# Patient Record
Sex: MALE | Race: WHITE | NOT HISPANIC OR LATINO | Employment: OTHER | URBAN - METROPOLITAN AREA
[De-identification: names, ages, dates, MRNs, and addresses within clinical notes are randomized per-mention and may not be internally consistent; named-entity substitution may affect disease eponyms.]

---

## 2023-10-04 ENCOUNTER — OFFICE VISIT (OUTPATIENT)
Dept: FAMILY MEDICINE | Facility: CLINIC | Age: 73
End: 2023-10-04
Payer: COMMERCIAL

## 2023-10-04 VITALS
HEIGHT: 73 IN | TEMPERATURE: 97.7 F | BODY MASS INDEX: 21.87 KG/M2 | DIASTOLIC BLOOD PRESSURE: 76 MMHG | WEIGHT: 165 LBS | SYSTOLIC BLOOD PRESSURE: 127 MMHG | OXYGEN SATURATION: 97 % | HEART RATE: 80 BPM

## 2023-10-04 DIAGNOSIS — M23.8X1 DEFICIENCY OF ANTERIOR CRUCIATE LIGAMENT OF RIGHT KNEE: ICD-10-CM

## 2023-10-04 DIAGNOSIS — Z23 NEED FOR IMMUNIZATION AGAINST INFLUENZA: Primary | ICD-10-CM

## 2023-10-04 RX ORDER — AMOXICILLIN 500 MG
1200 CAPSULE ORAL
COMMUNITY

## 2023-10-04 RX ORDER — MULTIVIT WITH MINERALS/LUTEIN
1 TABLET ORAL DAILY
COMMUNITY

## 2023-10-04 RX ORDER — TAMSULOSIN HYDROCHLORIDE 0.4 MG/1
0.4 CAPSULE ORAL 2 TIMES DAILY
COMMUNITY
Start: 2023-04-17 | End: 2023-12-12

## 2023-10-04 RX ORDER — CICLOPIROX 80 MG/ML
SOLUTION TOPICAL
COMMUNITY
Start: 2023-09-14

## 2023-10-04 RX ORDER — KETOCONAZOLE 20 MG/G
CREAM TOPICAL DAILY
COMMUNITY
End: 2024-04-22

## 2023-10-04 RX ORDER — SILDENAFIL CITRATE 20 MG/1
20 TABLET ORAL PRN
COMMUNITY
End: 2023-12-12

## 2023-10-04 ASSESSMENT — ENCOUNTER SYMPTOMS
DYSURIA: 0
NAUSEA: 0
CONSTIPATION: 0
JOINT SWELLING: 0
HEARTBURN: 0
DIARRHEA: 0
PALPITATIONS: 0
FREQUENCY: 0
COUGH: 0
SORE THROAT: 0
NERVOUS/ANXIOUS: 0
CHILLS: 0
SHORTNESS OF BREATH: 0
HEMATOCHEZIA: 0
MYALGIAS: 0
WEAKNESS: 0
ABDOMINAL PAIN: 0
ARTHRALGIAS: 0
EYE PAIN: 0
FEVER: 0
DIZZINESS: 0
PARESTHESIAS: 0
HEMATURIA: 0
HEADACHES: 0

## 2023-10-04 ASSESSMENT — ACTIVITIES OF DAILY LIVING (ADL): CURRENT_FUNCTION: NO ASSISTANCE NEEDED

## 2023-10-04 NOTE — NURSING NOTE
"73 year old  Chief Complaint   Patient presents with    [facility]     Health history         Blood pressure 127/76, pulse 80, temperature 97.7  F (36.5  C), temperature source Skin, height 1.842 m (6' 0.5\"), weight 74.8 kg (165 lb), SpO2 97 %. Body mass index is 22.07 kg/m .  Patient Active Problem List   Diagnosis    History of melanoma       Wt Readings from Last 2 Encounters:   10/04/23 74.8 kg (165 lb)     BP Readings from Last 3 Encounters:   10/04/23 127/76         Current Outpatient Medications   Medication    ciclopirox (PENLAC) 8 % external solution    ketoconazole (NIZORAL) 2 % external cream    LUTEIN PO    multivitamin (CENTRUM SILVER) tablet    Omega-3 Fatty Acids (FISH OIL) 1200 MG capsule    sildenafil (REVATIO) 20 MG tablet    tamsulosin (FLOMAX) 0.4 MG capsule     No current facility-administered medications for this visit.       Social History     Tobacco Use    Smoking status: Never    Smokeless tobacco: Never   Vaping Use    Vaping Use: Never used       Health Maintenance Due   Topic Date Due    ADVANCE CARE PLANNING  Never done    COLORECTAL CANCER SCREENING  Never done    HEPATITIS C SCREENING  Never done    LIPID  Never done    FALL RISK ASSESSMENT  Never done    AORTIC ANEURYSM SCREENING (SYSTEM ASSIGNED)  Never done    INFLUENZA VACCINE (1) 09/01/2023    COVID-19 Vaccine (6 - 2023-24 season) 09/01/2023       No results found for: PAP      October 4, 2023 9:14 AM   "

## 2023-10-04 NOTE — PATIENT INSTRUCTIONS
-  ASSESSMENT and PLAN:    Preventive health needs  - In need of influenza vaccine  Give Flu vaccine today    - Go to pharmacy for Covid Booster    -Up to date on Colonoscopy until May 2026    - Lipids from April 2023 reviewed with TC = 144, LDL at 70 and HDL at 64.   - PSA was 0.3  -A1C was 5.2%      Other Concerns  Prostatic hypertrophy - on Tamsulosin  History of Melanoma on RIGHT ankle. Has a dermatologist  Erectile dysfunction - on Sildenafil  RIGHT knee with non-repaired ACL tear in the past  Bilateral knee surgeries in the past      Follow-up in April 2024 for annual exam    Renzo Zafar MD  Family Medicine and Sports Medicine  [facility]

## 2023-10-04 NOTE — NURSING NOTE
"Injectable Influenza Immunization Documentation    1.  Has the patient received the information for the injectable influenza vaccine? YES     2. Is the patient 6 months of age or older? YES     3. Does the patient have any of the following contraindications?         Severe allergy to eggs? No     Severe allergic reaction to previous influenza vaccines? No   Severe allergy to latex? No       History of Guillain-McDermitt syndrome? No     Currently have a temperature greater than 100.4F? No        4.  Severely egg allergic patients should have flu vaccine eligibility assessed by an MD, RN, or pharmacist, and those who received flu vaccine should be observed for 15 min by an MD, RN, Pharmacist, Medical Technician, or member of clinic staff.\": YES    5. Latex-allergic patients should be given latex-free influenza vaccine Yes. Please reference the Vaccine latex table to determine if your clinic s product is latex-containing.       Vaccination given by Claudette Plaza CMA on 10/4/2023 at 9:38 AM        "

## 2023-10-04 NOTE — PROGRESS NOTES
Medical assistant intake:  Renzo Lara is a 73 year old male who presents to [facility] today for Establish Care (Health history/)       -  ASSESSMENT and PLAN:    Preventive health needs  - In need of influenza vaccine  Give Flu vaccine today    - Go to pharmacy for Covid Booster    -Up to date on Colonoscopy until May 2026    - Lipids from April 2023 reviewed with TC = 144, LDL at 70 and HDL at 64.   - PSA was 0.3  -A1C was 5.2%      Other Concerns  Prostatic hypertrophy - on Tamsulosin  History of Melanoma on RIGHT ankle. Has a dermatologist  Erectile dysfunction - on Sildenafil  RIGHT knee with non-repaired ACL tear in the past  Bilateral knee surgeries in the past      Follow-up in April 2024 for annual exam    Renzo Zafar MD  Family Medicine and Sports Medicine  [facility]      SUBJECTIVE:   Renzo is a 74 yo who is new to our clinic.       His PMH includes   Prostatic hypertrophy - on Tamsulosin  History of Melanoma. Has a dermatologist  Erectile dysfunction - on Sildenafil    He's interested in receiving his Flu vaccine today.     Review Of Systems:    See subjective.   Has otherwise been in usual state of health, e.g.   Cardiovascular: negative  Respiratory: No shortness of breath, dyspnea on exertion, cough, or hemoptysis  Gastrointestinal: negative  Genitourinary: negative    Problem list per EMR:  Patient Active Problem List   Diagnosis    History of melanoma       Current Outpatient Medications   Medication Sig Dispense Refill    ciclopirox (PENLAC) 8 % external solution APPLY A THIN COAT ONCE DAILY FOR 6 DAYS . USE ALCOHOL TO CLEAN TOENAIL ON DAY 7 THEN REPEAT UNTIL RESOLVED      ketoconazole (NIZORAL) 2 % external cream Apply topically daily      LUTEIN PO Take 1 tablet by mouth daily      multivitamin (CENTRUM SILVER) tablet Take 1 tablet by mouth daily      Omega-3 Fatty Acids (FISH OIL) 1200 MG capsule Take 1,200 mg by mouth      sildenafil (REVATIO) 20 MG tablet 20 mg as needed  "     tamsulosin (FLOMAX) 0.4 MG capsule Take 0.4 mg by mouth 2 times daily         Allergies   Allergen Reactions    Fluticasone      Anosmia        Social:   Social History     Social History Narrative    A  who worked in both [employer] and [employer].      once and then  and then . Now with a steady new partner.     Has 2 daughters and 2 granddaughters.          OBJECTIVE    Vitals: /76 (BP Location: Right arm, Patient Position: Sitting, Cuff Size: Adult Regular)   Pulse 80   Temp 97.7  F (36.5  C) (Skin)   Ht 1.842 m (6' 0.5\")   Wt 74.8 kg (165 lb)   SpO2 97%   BMI 22.07 kg/m    BMI= Body mass index is 22.07 kg/m .    He appears well and in no distress.  He appears is a physically fit 73-year-old.    Neck is supple without lymphadenopathy    Cardiovascular-regular rate and rhythm without murmur.     Lungs are clear to auscultation    Extremities are without edema    Gait is normal.    SEE TOP OF NOTE FOR ASSESSMENT AND PLAN    --Renzo Zafar MD  [facility], Department of Family Medicine and Community Health  "

## 2023-10-16 ENCOUNTER — MYC MEDICAL ADVICE (OUTPATIENT)
Dept: FAMILY MEDICINE | Facility: CLINIC | Age: 73
End: 2023-10-16

## 2023-12-12 ENCOUNTER — OFFICE VISIT (OUTPATIENT)
Dept: FAMILY MEDICINE | Facility: CLINIC | Age: 73
End: 2023-12-12
Payer: COMMERCIAL

## 2023-12-12 VITALS
SYSTOLIC BLOOD PRESSURE: 122 MMHG | WEIGHT: 162.04 LBS | DIASTOLIC BLOOD PRESSURE: 78 MMHG | OXYGEN SATURATION: 99 % | BODY MASS INDEX: 21.67 KG/M2 | HEART RATE: 73 BPM | TEMPERATURE: 97.1 F

## 2023-12-12 DIAGNOSIS — M67.431 GANGLION CYST OF WRIST, RIGHT: Primary | ICD-10-CM

## 2023-12-12 DIAGNOSIS — N52.9 ERECTILE DYSFUNCTION, UNSPECIFIED ERECTILE DYSFUNCTION TYPE: ICD-10-CM

## 2023-12-12 DIAGNOSIS — R39.198 SLOWING OF URINARY STREAM: ICD-10-CM

## 2023-12-12 RX ORDER — SILDENAFIL CITRATE 20 MG/1
TABLET ORAL
Qty: 30 TABLET | Refills: 1 | Status: SHIPPED | OUTPATIENT
Start: 2023-12-12 | End: 2023-12-19

## 2023-12-12 RX ORDER — TAMSULOSIN HYDROCHLORIDE 0.4 MG/1
0.4 CAPSULE ORAL 2 TIMES DAILY
Qty: 180 CAPSULE | Refills: 3 | Status: SHIPPED | OUTPATIENT
Start: 2023-12-12 | End: 2024-03-01

## 2023-12-12 NOTE — PROGRESS NOTES
Medical assistant intake:  Renzo Lara is a 73 year old male who presents to [facility] today for Mass (Right wrist lump )       -  ASSESSMENT and PLAN:    - RIGHT wrist with ganglion cyst that lysed spontaneously  Discussed pathophysiology of ganglion cyst.   Use compression  Offered X-rays of wrist, but Renzo declined for now.         - Med refills needed.     1. Slowing of urinary stream    - tamsulosin (FLOMAX) 0.4 MG capsule; Take 1 capsule (0.4 mg) by mouth 2 times daily  Dispense: 180 capsule; Refill: 3    2. Erectile dysfunction, unspecified erectile dysfunction type    - sildenafil (REVATIO) 20 MG tablet; Take 30-60 minutes prior to sex  Dispense: 30 tablet; Refill: 1    Warned to beware of lightheadedness when using the 2 medicines. Renzo is aware and has used in the past without problems.     Return to clinic in Spring 2024 for wellness visit.     Renzo Zafar MD  Family Medicine and Sports Medicine  [facility]      SUBJECTIVE:   Renzo  is here today with some swelling near his right wrist.  This is on the volar aspect and radial side.  He reports a long history of some diffuse swelling in this area but then last week when he was in [place] he noticed a smaller bump that was slightly distal to his normal swelling and felt harder and more encapsulated.  It was mobile.  He called an appointment.  Then within the past 2 nights perhaps while he was sleeping and had his head under his pillow he awoke in the morning and noticed that the swelling was gone.  He almost canceled his visit today.  He is here to discuss what this may have been and also to have some medication refills for Flomax and sildenafil.    Review Of Systems:    See subjective.   Has otherwise been in usual state of health, e.g.   Cardiovascular: negative  Respiratory: No shortness of breath, dyspnea on exertion, cough, or hemoptysis  Gastrointestinal: negative  Genitourinary: negative    Problem list per EMR:  Patient  Active Problem List   Diagnosis    History of melanoma       Current Outpatient Medications   Medication Sig Dispense Refill    ciclopirox (PENLAC) 8 % external solution APPLY A THIN COAT ONCE DAILY FOR 6 DAYS . USE ALCOHOL TO CLEAN TOENAIL ON DAY 7 THEN REPEAT UNTIL RESOLVED      ketoconazole (NIZORAL) 2 % external cream Apply topically daily      multivitamin (CENTRUM SILVER) tablet Take 1 tablet by mouth daily      Omega-3 Fatty Acids (FISH OIL) 1200 MG capsule Take 1,200 mg by mouth      sildenafil (REVATIO) 20 MG tablet Take 30-60 minutes prior to sex 30 tablet 1    tamsulosin (FLOMAX) 0.4 MG capsule Take 1 capsule (0.4 mg) by mouth 2 times daily 180 capsule 3       Allergies   Allergen Reactions    Fluticasone      Anosmia        Social:     A retired .      OBJECTIVE    Vitals: /78   Pulse 73   Temp 97.1  F (36.2  C)   Wt 73.5 kg (162 lb 0.6 oz)   SpO2 99%   BMI 21.67 kg/m    BMI= Body mass index is 21.67 kg/m .  He appears well and in no distress.   right wrist is with a small bit of diffuse swelling just proximal to the radial styloid and he states that that has been present for a long time and is unchanged.  It is nonpulsatile.  It is not red and it is not warm.  He describes an area just distal to his radial styloid where he had a swelling that has since resolved.  He has full range of motion of his wrist and digits  No pain with palpation over the scaphoid tubercle and no pain in the snuffbox.      SEE TOP OF NOTE FOR ASSESSMENT AND PLAN    --Renzo Zafar MD  [facility], Department of Family Medicine and Community Health

## 2023-12-12 NOTE — NURSING NOTE
"Renzo  73 year old    Chief Complaint   Patient presents with    Mass     Right wrist lump             Blood pressure 122/78, pulse 73, temperature 97.1  F (36.2  C), weight 73.5 kg (162 lb 0.6 oz), SpO2 99%. Body mass index is 21.67 kg/m .    Patient Active Problem List   Diagnosis    History of melanoma              Wt Readings from Last 2 Encounters:   12/12/23 73.5 kg (162 lb 0.6 oz)   10/04/23 74.8 kg (165 lb)       BP Readings from Last 3 Encounters:   12/12/23 122/78   10/04/23 127/76                Current Outpatient Medications   Medication    ciclopirox (PENLAC) 8 % external solution    ketoconazole (NIZORAL) 2 % external cream    multivitamin (CENTRUM SILVER) tablet    Omega-3 Fatty Acids (FISH OIL) 1200 MG capsule    sildenafil (REVATIO) 20 MG tablet    tamsulosin (FLOMAX) 0.4 MG capsule     No current facility-administered medications for this visit.              Social History     Tobacco Use    Smoking status: Never    Smokeless tobacco: Never   Vaping Use    Vaping Use: Never used              Health Maintenance Due   Topic Date Due    ADVANCE CARE PLANNING  Never done    HEPATITIS C SCREENING  Never done    LIPID  Never done    RSV VACCINE (Pregnancy & 60+) (1 - 1-dose 60+ series) Never done    FALL RISK ASSESSMENT  Never done    AORTIC ANEURYSM SCREENING (SYSTEM ASSIGNED)  Never done            No results found for: \"PAP\"           December 12, 2023 8:56 AM    "

## 2023-12-12 NOTE — PATIENT INSTRUCTIONS
-  ASSESSMENT and PLAN:    - RIGHT wrist with ganglion cyst that lysed spontaneously  Discussed pathophysiology of ganglion cyst.   Use compression  Offered X-rays of wrist, but Renzo declined for now.         - Med refills needed.     1. Slowing of urinary stream    - tamsulosin (FLOMAX) 0.4 MG capsule; Take 1 capsule (0.4 mg) by mouth 2 times daily  Dispense: 180 capsule; Refill: 3    2. Erectile dysfunction, unspecified erectile dysfunction type    - sildenafil (REVATIO) 20 MG tablet; Take 30-60 minutes prior to sex  Dispense: 30 tablet; Refill: 1    Warned to beware of lightheadedness when using the 2 medicines. Renzo is aware and has used in the past without problems.     Renzo Zafar MD  Family Medicine and Sports Medicine  [facility]

## 2023-12-13 ENCOUNTER — TELEPHONE (OUTPATIENT)
Dept: FAMILY MEDICINE | Facility: CLINIC | Age: 73
End: 2023-12-13

## 2023-12-13 NOTE — TELEPHONE ENCOUNTER
Prior Authorization Retail Medication Request    Medication/Dose: Sildenafil (REVATIO) 20 MG  Diagnosis and ICD code (if different than what is on RX):  Same  New/renewal/insurance change PA/secondary ins. PA:  Previously Tried and Failed:  Same  Rationale:  Same    Insurance   Primary: Same  Insurance ID:  Same    Secondary (if applicable):Same  Insurance ID:  Same    Covermymeds  Key:  [id number]  Last Name:  Jane  :  1950    Pharmacy Information (if different than what is on RX)  Name:  Same  Phone:  Same  Fax:Same    PA request came from [employer].  (I'm assuming initiated by [facility])    JOSÉ MIGUEL WalkerN, RN, CCM  RN Care Coordinator  [facility]  23  9:36 AM  Phone: [phone]

## 2023-12-16 NOTE — TELEPHONE ENCOUNTER
Central Prior Authorization Team   Phone: [phone]    PA Initiation    Medication: Sildenafil (REVATIO) 20 MG  Insurance Company: [employer] ([facility]) - Phone [phone] Fax [phone]  Pharmacy Filling the Rx: [facility] - [facility] [address] [place] [address]  Filling Pharmacy Phone: [phone]  Filling Pharmacy Fax:    Start Date: 12/16/2023

## 2023-12-17 ENCOUNTER — MYC MEDICAL ADVICE (OUTPATIENT)
Dept: FAMILY MEDICINE | Facility: CLINIC | Age: 73
End: 2023-12-17

## 2023-12-18 NOTE — TELEPHONE ENCOUNTER
PRIOR AUTHORIZATION DENIED    Medication: Sildenafil (REVATIO) 20 MG-PA DENIED     Denial Date: 12/16/2023    Denial Rational:           Appeal Information:

## 2023-12-19 DIAGNOSIS — N52.9 ERECTILE DYSFUNCTION, UNSPECIFIED ERECTILE DYSFUNCTION TYPE: ICD-10-CM

## 2023-12-19 RX ORDER — SILDENAFIL CITRATE 20 MG/1
TABLET ORAL
Qty: 30 TABLET | Refills: 1 | Status: SHIPPED | OUTPATIENT
Start: 2023-12-19 | End: 2023-12-19

## 2023-12-19 RX ORDER — SILDENAFIL CITRATE 20 MG/1
TABLET ORAL
Qty: 30 TABLET | Refills: 1 | Status: SHIPPED | OUTPATIENT
Start: 2023-12-19

## 2024-03-01 ENCOUNTER — MYC MEDICAL ADVICE (OUTPATIENT)
Dept: FAMILY MEDICINE | Facility: CLINIC | Age: 74
End: 2024-03-01

## 2024-03-01 DIAGNOSIS — R39.198 SLOWING OF URINARY STREAM: ICD-10-CM

## 2024-03-01 RX ORDER — TAMSULOSIN HYDROCHLORIDE 0.4 MG/1
0.4 CAPSULE ORAL 2 TIMES DAILY
Qty: 180 CAPSULE | Refills: 3 | Status: CANCELLED | OUTPATIENT
Start: 2024-03-01

## 2024-03-08 ENCOUNTER — TELEPHONE (OUTPATIENT)
Dept: FAMILY MEDICINE | Facility: CLINIC | Age: 74
End: 2024-03-08

## 2024-03-08 NOTE — TELEPHONE ENCOUNTER
Spoke with representative Lala at [email or web address]. Confirmed diagnosis code for tamsulosin 0.4 mg.    Deuce NUNEZ, RN  03/08/24 11:16 AM

## 2024-03-08 NOTE — TELEPHONE ENCOUNTER
Who is calling? Vikash - [employer]   Reason for Call:Requests call back regarding TAMSULOSIN 0.4MG. Needs to know what the patient's diagnosis is for this medication and any previous medications the patient has tried under the same diagnosis.   Vikash said when calling back at [phone] press OPTION 3.   REFERENCE # [id number]

## 2024-03-11 ENCOUNTER — TELEPHONE (OUTPATIENT)
Dept: FAMILY MEDICINE | Facility: CLINIC | Age: 74
End: 2024-03-11

## 2024-03-11 NOTE — TELEPHONE ENCOUNTER
Prior Authorization Retail Medication Request    Medication/Dose: tamsulosin (FLOMAX) 0.4 MG capsule   Diagnosis and ICD code (if different than what is on RX):  same  New/renewal/insurance change PA/secondary ins. PA:  Previously Tried and Failed:  same  Rationale:  same    Insurance   Primary: same  Insurance ID:  same    Secondary (if applicable):same  Insurance ID:  same    Pharmacy Information (if different than what is on RX)  Name:  [facility]  Phone:  [phone]  Fax:[phone]    MAYRA Ferrer, RN  03/11/24, 1:18 PM

## 2024-03-21 NOTE — TELEPHONE ENCOUNTER
Retail Pharmacy Prior Authorization Team   Phone: [phone]    PA Initiation    Medication: TAMSULOSIN HCL 0.4 MG PO CAPS  Insurance Company: [employer] - Phone [phone] Fax [phone]  Pharmacy Filling the Rx: [facility] - [place], [place] - [address] [place] [address]  Filling Pharmacy Phone: [phone]  Filling Pharmacy Fax:    Start Date: 3/21/2024

## 2024-03-26 NOTE — TELEPHONE ENCOUNTER
Called [employer] for PA status check. Per rep April, additional information is needed to complete review and April stated the form was faxed on 3/22 but we have not received and request to have it re-faxed. April will refax and she stated that the due date to return in 3/28. Will complete and fax back.

## 2024-03-28 NOTE — TELEPHONE ENCOUNTER
Called [employer] to check on PA status as due date is 3/28/24 at 12:34pm. Per rep, the form was not received and provided me an alternative fax# [phone] and alanna it urgent. Rep stated he will also notify the clinical review dept to look out for it.     Fax successful

## 2024-04-02 NOTE — TELEPHONE ENCOUNTER
Called [facility] for status check, per rep, she stated that the PA was already denied a couple times before the last fax I sent on 3/28/24. Rep stated that the last rep should have told me that the PA was already denied and has been forwarded to an external reviewer (C2C) as a second level appeal. Rep will fax over all the denial letter.

## 2024-04-03 NOTE — TELEPHONE ENCOUNTER
Tier Exception DENIED    Medication: Tamsulosin 0.4mg caps  Insurance Company: [employer] - Phone [phone] Fax [phone]  Pharmacy Filling the RX: [facility] - [place], [place] - [address] [place] [address]  Filling Pharmacy Phone: [phone]          Appeal information:

## 2024-04-04 NOTE — TELEPHONE ENCOUNTER
Patient notified of PA denial.  Patient will finish the Tamsulosin he currently has (about a month's worth) and then I will ask Dr. Zafar to send in a Tier 1 prescription in a few weeks so he has a replacement to start on time so there isn't a gap in treatment.  JOSÉ MIGUEL WalkerN, RN, CCM  RN Care Coordinator  [facility]  04/04/24  9:28 AM  Phone: [phone]

## 2024-04-15 ASSESSMENT — SOCIAL DETERMINANTS OF HEALTH (SDOH): HOW OFTEN DO YOU GET TOGETHER WITH FRIENDS OR RELATIVES?: MORE THAN THREE TIMES A WEEK

## 2024-04-22 ENCOUNTER — OFFICE VISIT (OUTPATIENT)
Dept: FAMILY MEDICINE | Facility: CLINIC | Age: 74
End: 2024-04-22
Payer: COMMERCIAL

## 2024-04-22 VITALS
BODY MASS INDEX: 21.74 KG/M2 | DIASTOLIC BLOOD PRESSURE: 67 MMHG | HEIGHT: 73 IN | TEMPERATURE: 97.1 F | OXYGEN SATURATION: 99 % | SYSTOLIC BLOOD PRESSURE: 115 MMHG | HEART RATE: 70 BPM | WEIGHT: 164 LBS

## 2024-04-22 DIAGNOSIS — S83.511A RUPTURE OF ANTERIOR CRUCIATE LIGAMENT OF RIGHT KNEE, INITIAL ENCOUNTER: ICD-10-CM

## 2024-04-22 DIAGNOSIS — Z00.00 ANNUAL PHYSICAL EXAM: Primary | ICD-10-CM

## 2024-04-22 DIAGNOSIS — Z13.6 SCREENING FOR CARDIOVASCULAR CONDITION: ICD-10-CM

## 2024-04-22 DIAGNOSIS — Z11.59 NEED FOR HEPATITIS C SCREENING TEST: ICD-10-CM

## 2024-04-22 DIAGNOSIS — Z13.228 SCREENING FOR METABOLIC DISORDER: ICD-10-CM

## 2024-04-22 DIAGNOSIS — Z12.5 SCREENING FOR PROSTATE CANCER: ICD-10-CM

## 2024-04-22 DIAGNOSIS — Z13.1 SCREENING FOR DIABETES MELLITUS: ICD-10-CM

## 2024-04-22 LAB
ALBUMIN SERPL BCG-MCNC: 4.5 G/DL (ref 3.5–5.2)
ALP SERPL-CCNC: 65 U/L (ref 40–150)
ALT SERPL W P-5'-P-CCNC: 17 U/L (ref 0–70)
ANION GAP SERPL CALCULATED.3IONS-SCNC: 10 MMOL/L (ref 7–15)
AST SERPL W P-5'-P-CCNC: 23 U/L (ref 0–45)
BILIRUB SERPL-MCNC: 0.8 MG/DL
BUN SERPL-MCNC: 17.9 MG/DL (ref 8–23)
CALCIUM SERPL-MCNC: 9.7 MG/DL (ref 8.8–10.2)
CHLORIDE SERPL-SCNC: 104 MMOL/L (ref 98–107)
CHOLEST SERPL-MCNC: 127 MG/DL
CREAT SERPL-MCNC: 0.91 MG/DL (ref 0.67–1.17)
DEPRECATED HCO3 PLAS-SCNC: 28 MMOL/L (ref 22–29)
EGFRCR SERPLBLD CKD-EPI 2021: 90 ML/MIN/1.73M2
FASTING STATUS PATIENT QL REPORTED: YES
GLUCOSE SERPL-MCNC: 86 MG/DL (ref 70–99)
HBA1C MFR BLD: 4.9 % (ref 0–5.6)
HCV AB SERPL QL IA: NONREACTIVE
HDLC SERPL-MCNC: 60 MG/DL
LDLC SERPL CALC-MCNC: 56 MG/DL
NONHDLC SERPL-MCNC: 67 MG/DL
POTASSIUM SERPL-SCNC: 5 MMOL/L (ref 3.4–5.3)
PROT SERPL-MCNC: 6.8 G/DL (ref 6.4–8.3)
PSA SERPL DL<=0.01 NG/ML-MCNC: 0.25 NG/ML (ref 0–6.5)
SODIUM SERPL-SCNC: 142 MMOL/L (ref 135–145)
TRIGL SERPL-MCNC: 54 MG/DL

## 2024-04-22 NOTE — NURSING NOTE
"73 year old  Chief Complaint   Patient presents with    Physical     No concerns.        Blood pressure 115/67, pulse 70, temperature 97.1  F (36.2  C), temperature source Skin, height 1.842 m (6' 0.52\"), weight 74.4 kg (164 lb), SpO2 99%. Body mass index is 21.92 kg/m .  Patient Active Problem List   Diagnosis    History of melanoma    Erectile dysfunction, unspecified erectile dysfunction type    Slowing of urinary stream       Wt Readings from Last 2 Encounters:   04/22/24 74.4 kg (164 lb)   12/12/23 73.5 kg (162 lb 0.6 oz)     BP Readings from Last 3 Encounters:   04/22/24 115/67   12/12/23 122/78   10/04/23 127/76         Current Outpatient Medications   Medication Sig Dispense Refill    ciclopirox (PENLAC) 8 % external solution APPLY A THIN COAT ONCE DAILY FOR 6 DAYS . USE ALCOHOL TO CLEAN TOENAIL ON DAY 7 THEN REPEAT UNTIL RESOLVED      multivitamin (CENTRUM SILVER) tablet Take 1 tablet by mouth daily      Omega-3 Fatty Acids (FISH OIL) 1200 MG capsule Take 1,200 mg by mouth      sildenafil (REVATIO) 20 MG tablet Take one tablet 30-60 minutes prior to sex once daily 30 tablet 1    tamsulosin (FLOMAX) 0.4 MG capsule Take 1 capsule (0.4 mg) by mouth 2 times daily 180 capsule 3    ketoconazole (NIZORAL) 2 % external cream Apply topically daily       No current facility-administered medications for this visit.       Social History     Tobacco Use    Smoking status: Never    Smokeless tobacco: Never   Vaping Use    Vaping status: Never Used   Substance Use Topics    Drug use: Never       Health Maintenance Due   Topic Date Due    ADVANCE CARE PLANNING  Never done    GLUCOSE  Never done    HEPATITIS C SCREENING  Never done    LIPID  Never done    RSV VACCINE (Pregnancy & 60+) (1 - 1-dose 60+ series) Never done    MEDICARE ANNUAL WELLNESS VISIT  04/17/2024       No results found for: \"PAP\"      April 22, 2024 8:30 AM   "

## 2024-04-22 NOTE — PATIENT INSTRUCTIONS
"IMPRESSION  Healthy 72 yo.     ASSESSMENT/PLAN:    Annual Exam/Preventive Issues   - Labs: Check Lipids, CMP, A1C as both parents had DM, and Hep C as never done.   - Immunizations: Up to date  - Cancer screenings: Up to date on Colon screening until 2026.   Check PSA today  - Other recommendations:   Ask for a hearing evaluation when you get your ears evaluated in 2 days  For screening of eyes in 2 days  Has regular follow up with Dermatology  Send or bring us your \"Healthcare directives      -Specific concerns:     Chronic RIGHT knee ACL tear from injury about 30 years ago  -Not causing problems.   -Work on Hamstring strength and balance.     2. Slow urinary stream  -Takes Tamsulosin, takes 0.8 mg/day. No need for refills.     -Follow up: as needed or in a year    Renzo Zafar MD  Family Medicine and Sports Medicine  "

## 2024-04-22 NOTE — PROGRESS NOTES
"Preventive Care Visit  [facility]  Renzo Lara presents to [facility] today to have an annual exam.    IMPRESSION  Healthy 74 yo.     ASSESSMENT/PLAN:    Annual Exam/Preventive Issues   - Labs: Check Lipids, CMP, A1C as both parents had DM, and Hep C as never done.   - Immunizations: Up to date  - Cancer screenings: Up to date on Colon screening until 2026.   Check PSA today  - Other recommendations:   Ask for a hearing evaluation when you get your ears evaluated in 2 days  For screening of eyes in 2 days  Has regular follow up with Dermatology  Send or bring us your \"Healthcare directives      -Specific concerns:     Chronic RIGHT knee ACL tear from injury about 30 years ago  -Not causing problems.   -Work on Hamstring strength and balance.     2. Slow urinary stream  -Takes Tamsulosin, takes 0.8 mg/day. No need for refills.     -Follow up: as needed or in a year    Renzo Zafar MD  Family Medicine and Sports Medicine      INTRODUCTION:   Renzo is a 74 yo who I've met a few times over the past year but never for an annual exam.   He's feeling well and here for an annual preventive exam.   Main medical issues have been as per the Problem List.    Cognition: No concerns. 5/5 on minicog  Eyes: Has regular evaluations. Next is in 2 days. Has cataracts that are being observed.   Hearing: Has some hearing loss. Goes for regular ear cleanings.   Dental: Has regular visits  Skin: H/O melanoma on leg. Sees Dermatology 2 times/year  Balance: Good. No falls.     Colorectal cancer screenings: Reports a normal colonoscopy in 2016. In Care Everywhere system  Advance directive: Has a copy.                     4/15/2024   General Health   How would you rate your overall physical health? Excellent   Feel stress (tense, anxious, or unable to sleep) Only a little   (!) STRESS CONCERN      4/15/2024   Nutrition   Diet: Regular (no restrictions)         10/4/2023   Exercise   Frequency of exercise: 4-5 days/week "         4/15/2024   Social Factors   Frequency of gathering with friends or relatives More than three times a week   Worry food won't last until get money to buy more No   Food not last or not have enough money for food? No   Do you have housing?  Yes   Are you worried about losing your housing? No   Lack of transportation? No   Unable to get utilities (heat,electricity)? No         4/15/2024   Fall Risk   Fallen 2 or more times in the past year? No   Trouble with walking or balance? No          4/15/2024   Activities of Daily Living- Home Safety   Needs help with the following daily activites None of the above   Safety concerns in the home None of the above         4/15/2024   Dental   Dentist two times every year? Yes         4/15/2024   Hearing Screening   Hearing concerns? (!) I NEED TO ASK PEOPLE TO SPEAK UP OR REPEAT THEMSELVES.         4/15/2024   Driving Risk Screening   Patient/family members have concerns about driving No         4/15/2024   General Alertness/Fatigue Screening   Have you been more tired than usual lately? No         4/15/2024   Urinary Incontinence Screening   Bothered by leaking urine in past 6 months No         4/15/2024   TB Screening   Were you born outside of the [place]? No         Today's PHQ-2 Score:       4/22/2024     8:18 AM   PHQ-2 ( 1999 Pfizer)   Q1: Little interest or pleasure in doing things 0   Q2: Feeling down, depressed or hopeless 0   PHQ-2 Score 0   Q1: Little interest or pleasure in doing things Not at all   Q2: Feeling down, depressed or hopeless Not at all   PHQ-2 Score 0           4/15/2024   Substance Use   Alcohol more than 3/day or more than 7/wk Yes   How often do you have a drink containing alcohol 4 or more times a week   How many alcohol drinks on typical day 1 or 2   How often do you have 5+ drinks at one occasion Never   Audit 2/3 Score 0   How often not able to stop drinking once started Never   How often failed to do what normally expected Never   How often  needed first drink in am after a heavy drinking session Never   How often feeling of guilt or remorse after drinking Never   How often unable to remember what happened the night before Never   Have you or someone else been injured because of your drinking No   Has anyone been concerned or suggested you cut down on drinking No   TOTAL SCORE - AUDIT 4   Do you have a current opioid prescription? No   How severe/bad is pain from 1 to 10? 2/10   Do you use any other substances recreationally? No     Social History     Tobacco Use    Smoking status: Never    Smokeless tobacco: Never   Vaping Use    Vaping status: Never Used   Substance Use Topics    Drug use: Never       Alcohol intake involves about 5 nights/week and 1-2 drinks/night. He hasn't noticed any problems. None the last 2 nights.     Sleep is good. Sleeps from about 10pm to 4am and then occasionally takes a nap in daytime.     Patient Active Problem List   Diagnosis    History of melanoma    Erectile dysfunction, unspecified erectile dysfunction type    Slowing of urinary stream       Current Medications include:   Current Outpatient Medications   Medication Sig Dispense Refill    ciclopirox (PENLAC) 8 % external solution APPLY A THIN COAT ONCE DAILY FOR 6 DAYS . USE ALCOHOL TO CLEAN TOENAIL ON DAY 7 THEN REPEAT UNTIL RESOLVED      multivitamin (CENTRUM SILVER) tablet Take 1 tablet by mouth daily      Omega-3 Fatty Acids (FISH OIL) 1200 MG capsule Take 1,200 mg by mouth      sildenafil (REVATIO) 20 MG tablet Take one tablet 30-60 minutes prior to sex once daily 30 tablet 1    tamsulosin (FLOMAX) 0.4 MG capsule Take 1 capsule (0.4 mg) by mouth 2 times daily 180 capsule 3    ketoconazole (NIZORAL) 2 % external cream Apply topically daily       Allergies   Allergen Reactions    Fluticasone      Anosmia       No past surgical history on file.    Health Maintenance   Topic Date Due    ADVANCE CARE PLANNING  Never done    GLUCOSE  Never done    HEPATITIS C SCREENING   Never done    LIPID  Never done    RSV VACCINE (Pregnancy & 60+) (1 - 1-dose 60+ series) Never done    MEDICARE ANNUAL WELLNESS VISIT  04/17/2024    FALL RISK ASSESSMENT  04/22/2025    COLORECTAL CANCER SCREENING  05/10/2026    DTAP/TDAP/TD IMMUNIZATION (3 - Td or Tdap) 06/13/2026    PHQ-2 (once per calendar year)  Completed    INFLUENZA VACCINE  Completed    Pneumococcal Vaccine: 65+ Years  Completed    ZOSTER IMMUNIZATION  Completed    COVID-19 Vaccine  Completed    IPV IMMUNIZATION  Aged Out    HPV IMMUNIZATION  Aged Out    MENINGITIS IMMUNIZATION  Aged Out    RSV MONOCLONAL ANTIBODY  Aged Out       Immunizations are as follows:      Immunization History   Administered Date(s) Administered    COVID-19 12+ (2023-24) (Pfizer) 10/13/2023    COVID-19 Bivalent 12+ (Pfizer) 10/04/2022    COVID-19 MONOVALENT 12+ (Pfizer) 02/17/2021, 03/10/2021, 10/07/2021    COVID-19 Monovalent 12+ (Pfizer 2022) 05/19/2022    Influenza Vaccine 65+ (FLUAD) 09/04/2021    Influenza Vaccine 65+ (Fluzone HD) 09/13/2022, 10/04/2023    Influenza Vaccine, 6+MO IM (QUADRIVALENT W/PRESERVATIVES) 10/07/2019    Pneumo Conj 13-V (2010&after) 01/30/2017    Pneumococcal 23 valent 02/01/2018    TDAP (Adacel,Boostrix) 06/13/2016    Zoster recombinant adjuvanted (SHINGRIX) 10/07/2019, 12/07/2019    Zoster vaccine, live 01/16/2012       Social:  Social History     Social History Narrative    A  who worked in both [employer].      once and then  and then . Now with a steady new partner, Dharmesh Bryson.     Has 2 daughters and 2 granddaughters.     Enjoys: Walking, Bicycling, Swimming, and Reading.        ED: Yes. Takes Sildenafil on occasion. No need for refills.   STD concerns: None.     ROS  PHQ-2 Score:         4/22/2024     8:18 AM 10/4/2023     9:11 AM   PHQ-2 ( 1999 Pfizer)   Q1: Little interest or pleasure in doing things 0 0   Q2: Feeling down, depressed or hopeless 0 0   PHQ-2 Score 0 0   Q1: Little interest or  "pleasure in doing things Not at all    Q2: Feeling down, depressed or hopeless Not at all    PHQ-2 Score 0        EXAM  /67 (BP Location: Right arm, Patient Position: Sitting, Cuff Size: Adult Regular)   Pulse 70   Temp 97.1  F (36.2  C) (Skin)   Ht 1.842 m (6' 0.52\")   Wt 74.4 kg (164 lb)   SpO2 99%   BMI 21.92 kg/m      GENERAL APPEARANCE: Appears well.   HEENT: Eyes and Ears normal. Neck is supple. No adenopathy, thyroid normal to palpation  RESP: Lungs clear to auscultation bilaterally.  Axillae: no palpable axillary masses or adenopathy  CV: regular rate and rhythm, normal S1 S2, no murmur, no carotid bruits  ABDOMEN: nontender, without HSM or masses. Bowel sounds normal  : Deferred. Reported no concerns.  Rectal exam: deferred  MS: RIGHT knee with positive Lachmans' (exam done as history of ACL tear with no surgery). Other ligaments are stable.   Normal Gait and ROM of extremities.   SKIN: Has age related skin changes. No suspicious lesions or rashes  NEURO: Normal strength and tone, sensory exam grossly normal  PSYCH: mentation and affect appear normal.   EXT: no peripheral edema        SEE TOP OF NOTE FOR ASSESSMENT AND PLAN      Renzo Zafar MD  Family Medicine and Sports Medicine  [facility] Department of Family Medicine and Community Health    "

## 2024-04-24 ENCOUNTER — TRANSFERRED RECORDS (OUTPATIENT)
Dept: HEALTH INFORMATION MANAGEMENT | Facility: CLINIC | Age: 74
End: 2024-04-24

## 2024-04-29 ENCOUNTER — TELEPHONE (OUTPATIENT)
Dept: FAMILY MEDICINE | Facility: CLINIC | Age: 74
End: 2024-04-29

## 2024-04-29 DIAGNOSIS — N40.0 BENIGN PROSTATIC HYPERPLASIA, UNSPECIFIED WHETHER LOWER URINARY TRACT SYMPTOMS PRESENT: Primary | ICD-10-CM

## 2024-04-29 RX ORDER — TERAZOSIN 1 MG/1
1 CAPSULE ORAL AT BEDTIME
Qty: 90 CAPSULE | Refills: 0 | Status: SHIPPED | OUTPATIENT
Start: 2024-04-29 | End: 2024-09-19

## 2024-04-29 NOTE — TELEPHONE ENCOUNTER
Per Dr. Zafar, new prescription for Terazosin 1 mg sent to [facility] to replace Tamsulosin which is no longer on his insurance formulary.  Will send [employer] message to Renzo letting him know to expect the new medication in the mail and to monitor himself for light headedness.  I will check in with him after a few days and if he's doing okay, we'll increase the dose to 2 mg (we'll have him take two of the 1 mg tablets) until he finished the first bottle of medication.  Then I'll send a new prescription for the 2 mg tablets.  JOSÉ MIGUEL WalkerN, RN, CCM  RN Care Coordinator  [facility]  04/29/24  10:44 AM  Phone: [phone]

## 2024-04-29 NOTE — TELEPHONE ENCOUNTER
----- Message from Renzo Zafar MD sent at 4/29/2024  9:58 AM CDT -----  Regarding: RE: Send new Rx  Haven,  Let's try Terazosin, 1 mg at bedtime for a few days and then can increase to 2 mg at bedtime. Let me know how it's going after a few weeks. Beware of lightheadedness.   Thanks!    --Odell  ----- Message -----  From: Haven Pastrana RN  Sent: 4/25/2024   9:19 AM CDT  To: Renzo Zafar MD  Subject: Send new Rx                                      Hi Renzo Bain has been on Tamsulosin for a while now, however, it is not on his insurance formulary this year and we tried to do a PA and they denied it because he has not tried any of the comparable medications that are on their formulary.  The other medications on the formulary are:    Terazosin - Tier 1 (cheapest for him)  Prazosin - Tier 2  Doxazosin - Tier 2  Aluzosin - Tier 2    Would any of these be an option?  If so, could you please choose one and send a prescription to [facility] pharmacy?  Thanks!  Haven

## 2024-09-19 ENCOUNTER — MYC REFILL (OUTPATIENT)
Dept: FAMILY MEDICINE | Facility: CLINIC | Age: 74
End: 2024-09-19

## 2024-09-19 DIAGNOSIS — N40.0 BENIGN PROSTATIC HYPERPLASIA, UNSPECIFIED WHETHER LOWER URINARY TRACT SYMPTOMS PRESENT: ICD-10-CM

## 2024-09-20 RX ORDER — TERAZOSIN 1 MG/1
1 CAPSULE ORAL AT BEDTIME
Qty: 90 CAPSULE | Refills: 1 | Status: SHIPPED | OUTPATIENT
Start: 2024-09-20

## 2024-09-20 NOTE — TELEPHONE ENCOUNTER
Medication requested: terazosin (HYTRIN) 1 MG capsule   Last office visit: 4/22/24  [facility] appointments: none  Medication last refilled: 4/29/24; 90 + 0 refills  Last qualifying labs:   BP Readings from Last 3 Encounters:   04/22/24 115/67   12/12/23 122/78   10/04/23 127/76     Routing refill request to provider for review/approval because:  Failed protocol due to concurrent Rx for sildenafil    Deuce NUNEZ, RN  09/20/24 9:35 AM

## 2024-12-30 ENCOUNTER — MYC MEDICAL ADVICE (OUTPATIENT)
Dept: FAMILY MEDICINE | Facility: CLINIC | Age: 74
End: 2024-12-30

## 2024-12-30 DIAGNOSIS — N40.1 BENIGN PROSTATIC HYPERPLASIA WITH LOWER URINARY TRACT SYMPTOMS, SYMPTOM DETAILS UNSPECIFIED: ICD-10-CM

## 2024-12-30 RX ORDER — TAMSULOSIN HYDROCHLORIDE 0.4 MG/1
0.4 CAPSULE ORAL 2 TIMES DAILY
Qty: 180 CAPSULE | Refills: 0 | Status: SHIPPED | OUTPATIENT
Start: 2024-12-30

## 2024-12-30 NOTE — TELEPHONE ENCOUNTER
Medication requested: tamsulosin (FLOMAX) 0.4 MG capsule    Last office visit: 4/22/2024  [facility] appointments: none  Medication last refilled: 3/15/2024; 180 caps + 3 refills  Last qualifying labs:     BP Readings from Last 3 Encounters:   04/22/24 115/67   12/12/23 122/78   10/04/23 127/76     Routing refill request to provider for review/approval because:  Pt changed to terazosin on 9/20/2024 due to insurance coverage. He would like to go back to Tamsulosin and is willing to pay the difference in cost.    JOSÉ MIGUEL FerrerN, RN  12/30/24, 12:37 PM

## 2025-02-26 ENCOUNTER — MYC MEDICAL ADVICE (OUTPATIENT)
Dept: FAMILY MEDICINE | Facility: CLINIC | Age: 75
End: 2025-02-26

## 2025-02-26 DIAGNOSIS — N40.1 BENIGN PROSTATIC HYPERPLASIA WITH LOWER URINARY TRACT SYMPTOMS, SYMPTOM DETAILS UNSPECIFIED: ICD-10-CM

## 2025-02-26 RX ORDER — TAMSULOSIN HYDROCHLORIDE 0.4 MG/1
0.4 CAPSULE ORAL 2 TIMES DAILY
Qty: 180 CAPSULE | Refills: 0 | Status: SHIPPED | OUTPATIENT
Start: 2025-02-26

## 2025-02-26 NOTE — TELEPHONE ENCOUNTER
Medication requested: tamsulosin (FLOMAX) 0.4 MG capsule   Last office visit: 4/22/24  [facility] appointments: none  Medication last refilled: 12/30/24; 180 + 0 refills  Last qualifying labs:   BP Readings from Last 3 Encounters:   04/22/24 115/67   12/12/23 122/78   10/04/23 127/76     Prescription approved per [facility] Refill Protocol.    Deuce NUNEZ, RN  02/26/25 8:28 AM

## 2025-03-16 ENCOUNTER — HEALTH MAINTENANCE LETTER (OUTPATIENT)
Age: 75
End: 2025-03-16

## 2025-04-18 SDOH — HEALTH STABILITY: PHYSICAL HEALTH: ON AVERAGE, HOW MANY DAYS PER WEEK DO YOU ENGAGE IN MODERATE TO STRENUOUS EXERCISE (LIKE A BRISK WALK)?: 6 DAYS

## 2025-04-18 SDOH — HEALTH STABILITY: PHYSICAL HEALTH: ON AVERAGE, HOW MANY MINUTES DO YOU ENGAGE IN EXERCISE AT THIS LEVEL?: 90 MIN

## 2025-04-18 ASSESSMENT — SOCIAL DETERMINANTS OF HEALTH (SDOH): HOW OFTEN DO YOU GET TOGETHER WITH FRIENDS OR RELATIVES?: ONCE A WEEK

## 2025-04-23 ENCOUNTER — OFFICE VISIT (OUTPATIENT)
Dept: FAMILY MEDICINE | Facility: CLINIC | Age: 75
End: 2025-04-23
Payer: COMMERCIAL

## 2025-04-23 ENCOUNTER — TELEPHONE (OUTPATIENT)
Dept: FAMILY MEDICINE | Facility: CLINIC | Age: 75
End: 2025-04-23

## 2025-04-23 VITALS
DIASTOLIC BLOOD PRESSURE: 75 MMHG | WEIGHT: 172.5 LBS | HEART RATE: 68 BPM | HEIGHT: 72 IN | BODY MASS INDEX: 23.36 KG/M2 | RESPIRATION RATE: 15 BRPM | OXYGEN SATURATION: 98 % | TEMPERATURE: 97.4 F | SYSTOLIC BLOOD PRESSURE: 122 MMHG

## 2025-04-23 DIAGNOSIS — N40.1 BENIGN PROSTATIC HYPERPLASIA WITH LOWER URINARY TRACT SYMPTOMS, SYMPTOM DETAILS UNSPECIFIED: ICD-10-CM

## 2025-04-23 DIAGNOSIS — Z13.6 SCREENING FOR CARDIOVASCULAR CONDITION: ICD-10-CM

## 2025-04-23 DIAGNOSIS — Z00.00 ANNUAL PHYSICAL EXAM: Primary | ICD-10-CM

## 2025-04-23 DIAGNOSIS — Z13.228 SCREENING FOR METABOLIC DISORDER: ICD-10-CM

## 2025-04-23 DIAGNOSIS — Z12.5 SCREENING FOR PROSTATE CANCER: ICD-10-CM

## 2025-04-23 DIAGNOSIS — N52.9 ERECTILE DYSFUNCTION, UNSPECIFIED ERECTILE DYSFUNCTION TYPE: ICD-10-CM

## 2025-04-23 DIAGNOSIS — Z13.1 SCREENING FOR DIABETES MELLITUS: ICD-10-CM

## 2025-04-23 LAB
ANION GAP SERPL CALCULATED.3IONS-SCNC: 9 MMOL/L (ref 7–15)
BUN SERPL-MCNC: 16.1 MG/DL (ref 8–23)
CALCIUM SERPL-MCNC: 9.5 MG/DL (ref 8.8–10.4)
CHLORIDE SERPL-SCNC: 101 MMOL/L (ref 98–107)
CHOLEST SERPL-MCNC: 142 MG/DL
CREAT SERPL-MCNC: 0.85 MG/DL (ref 0.67–1.17)
EGFRCR SERPLBLD CKD-EPI 2021: >90 ML/MIN/1.73M2
EST. AVERAGE GLUCOSE BLD GHB EST-MCNC: 100 MG/DL
FASTING STATUS PATIENT QL REPORTED: NO
FASTING STATUS PATIENT QL REPORTED: NO
GLUCOSE SERPL-MCNC: 103 MG/DL (ref 70–99)
HBA1C MFR BLD: 5.1 % (ref 0–5.6)
HCO3 SERPL-SCNC: 29 MMOL/L (ref 22–29)
HDLC SERPL-MCNC: 66 MG/DL
LDLC SERPL CALC-MCNC: 64 MG/DL
NONHDLC SERPL-MCNC: 76 MG/DL
POTASSIUM SERPL-SCNC: 4.6 MMOL/L (ref 3.4–5.3)
PSA SERPL DL<=0.01 NG/ML-MCNC: 0.38 NG/ML (ref 0–6.5)
SODIUM SERPL-SCNC: 139 MMOL/L (ref 135–145)
TRIGL SERPL-MCNC: 58 MG/DL

## 2025-04-23 PROCEDURE — 82465 ASSAY BLD/SERUM CHOLESTEROL: CPT | Mod: ORL | Performed by: FAMILY MEDICINE

## 2025-04-23 PROCEDURE — G0103 PSA SCREENING: HCPCS | Mod: ORL | Performed by: FAMILY MEDICINE

## 2025-04-23 PROCEDURE — 80048 BASIC METABOLIC PNL TOTAL CA: CPT | Mod: ORL | Performed by: FAMILY MEDICINE

## 2025-04-23 RX ORDER — SILDENAFIL CITRATE 20 MG/1
TABLET ORAL
Qty: 30 TABLET | Refills: 3 | Status: SHIPPED | OUTPATIENT
Start: 2025-04-23

## 2025-04-23 RX ORDER — TAMSULOSIN HYDROCHLORIDE 0.4 MG/1
0.4 CAPSULE ORAL 2 TIMES DAILY
Qty: 180 CAPSULE | Refills: 0 | Status: CANCELLED | OUTPATIENT
Start: 2025-04-23

## 2025-04-23 NOTE — NURSING NOTE
"74 year old  Chief Complaint   Patient presents with    Physical     Go over RX's.        Blood pressure 122/75, pulse 68, temperature 97.4  F (36.3  C), temperature source Temporal, resp. rate 15, height 1.84 m (6' 0.44\"), weight 78.2 kg (172 lb 8 oz), SpO2 98%. Body mass index is 23.11 kg/m .  Patient Active Problem List   Diagnosis    History of melanoma    Erectile dysfunction, unspecified erectile dysfunction type    Slowing of urinary stream    ACL tear       Wt Readings from Last 2 Encounters:   04/23/25 78.2 kg (172 lb 8 oz)   04/22/24 74.4 kg (164 lb)     BP Readings from Last 3 Encounters:   04/23/25 122/75   04/22/24 115/67   12/12/23 122/78         Current Outpatient Medications   Medication Sig Dispense Refill    multivitamin (CENTRUM SILVER) tablet Take 1 tablet by mouth daily      Omega-3 Fatty Acids (FISH OIL) 1200 MG capsule Take 1,200 mg by mouth      sildenafil (REVATIO) 20 MG tablet Take one tablet 30-60 minutes prior to sex once daily 30 tablet 1    tamsulosin (FLOMAX) 0.4 MG capsule Take 1 capsule (0.4 mg) by mouth 2 times daily. 180 capsule 0    ciclopirox (PENLAC) 8 % external solution APPLY A THIN COAT ONCE DAILY FOR 6 DAYS . USE ALCOHOL TO CLEAN TOENAIL ON DAY 7 THEN REPEAT UNTIL RESOLVED       No current facility-administered medications for this visit.       Social History     Tobacco Use    Smoking status: Never    Smokeless tobacco: Never   Vaping Use    Vaping status: Never Used   Substance Use Topics    Drug use: Never       Health Maintenance Due   Topic Date Due    ADVANCE CARE PLANNING  Never done    COVID-19 Vaccine (8 - 2024-25 season) 03/19/2025    MEDICARE ANNUAL WELLNESS VISIT  04/22/2025       No results found for: \"PAP\"      April 23, 2025 7:56 AM    "

## 2025-04-23 NOTE — PATIENT INSTRUCTIONS
ASSESSMENT/PLAN:    Annual Exam/Preventive Issues   - Labs: Check Lipids, BMP, A1C and PSA  - Immunizations: Up to date on all but covid and he elected to defer that.   - Cancer screenings:   Reports previous normal colonoscopies and being due in 2026.   - Other recommendations:   Send us your healthcare directives    -Specific concerns:     BPH with slow urinary stream on Tamsulosin. Doing well  Let us know when you need a refill  Erectile dysfunction  Refilled Sildenifil.   LEFT shoulder with likely rotator cuff tendinopathy, seems to be improving.   Let me know if symptoms worsen and we can refer to P.T.   RIGHT knee with chronic ACL tear  Encouraged Hamstring strengthening exercises, e.g. Hamstring curls with a physioball  Misc: I hope that you have a nice biking trip in [facility]!    Renzo Zafar MD  Family Medicine and Sports Medicine

## 2025-04-23 NOTE — TELEPHONE ENCOUNTER
Spoke with Brenna Davis, pharmacist with [facility], who called to report that Sildenafil (Revatio) 20 mg is not covered by Renzo's insurance.  However, if it is written as Sildenafil (Viagra) 25 mg, it is covered by his insurance.  Authorized [facility] to fill Sildenafil (Viagra) 25 mg, #30 with 3 refills.  JOSÉ MIGUEL WalkerN, RN, [facility]  RN Care Coordinator  [facility]  04/23/25  2:51 PM  Phone: [phone]

## 2025-04-23 NOTE — PROGRESS NOTES
Preventive Care Visit  [facility]  Renzo Zafar MD, Family Medicine  Apr 23, 2025        ASSESSMENT/PLAN:    Annual Exam/Preventive Issues   - Labs: Check Lipids, BMP, A1C and PSA  - Immunizations: Up to date on all but covid and he elected to defer that.   - Cancer screenings:   Reports previous normal colonoscopies and being due in 2026.   - Other recommendations:   Send us your healthcare directives    -Specific concerns:     BPH with slow urinary stream on Tamsulosin. Doing well  Let us know when you need a refill  Erectile dysfunction  Refilled Sildenifil.   LEFT shoulder with likely rotator cuff tendinopathy, seems to be improving.   Let me know if symptoms worsen and we can refer to P.T.   RIGHT knee with chronic ACL tear  Encouraged Hamstring strengthening exercises, e.g. Hamstring curls with a physioball  Misc: I hope that you have a nice biking trip in [facility]!    Renzo Zafar MD  Family Medicine and Sports Medicine      INTRODUCTION:     Renzo is a 73 yo here for an annual preventive exam.     He's feeling well and here for an annual preventive exam.     Has slow urinary stream and erectile dysfunction. Only new issue is LEFT shoulder aching.     Main medical issues have been as per the Problem List.     Cognition: No concerns. 5/5 on minicog  Eyes: Has regular evaluations. Next visit in a few days. Has cataracts that are being observed.   Hearing: Has some hearing loss. Goes for regular ear cleanings.   Dental: Has regular visits  Skin: H/O melanoma on leg. Sees Dermatology 2 times/year  Balance: Good. No falls.      Colorectal cancer screenings: Reports a normal colonoscopy in 2016. In Care Everywhere system        Current Outpatient Medications   Medication Sig Dispense Refill    multivitamin (CENTRUM SILVER) tablet Take 1 tablet by mouth daily      Omega-3 Fatty Acids (FISH OIL) 1200 MG capsule Take 1,200 mg by mouth      sildenafil (REVATIO) 20 MG tablet Take one tablet  30-60 minutes prior to sex once daily 30 tablet 3    tamsulosin (FLOMAX) 0.4 MG capsule Take 1 capsule (0.4 mg) by mouth 2 times daily. 180 capsule 0       Patient Active Problem List   Diagnosis    History of melanoma    Erectile dysfunction, unspecified erectile dysfunction type    Slowing of urinary stream    ACL tear       Social History     Social History Narrative    A  who worked in both [employer].      once and then  and then .     Has 2 daughters and 2 granddaughters.     Enjoys: Walking, Bicycling, Swimming, and Reading.          Advance Care Planning    Patient states has Health Care Directive and will send to [employer].        4/18/2025   General Health   How would you rate your overall physical health? Excellent   Feel stress (tense, anxious, or unable to sleep) Only a little   (!) STRESS CONCERN      4/18/2025   Nutrition   Diet: Low salt    Low fat/cholesterol    Carbohydrate counting    Vegetarian/vegan       Multiple values from one day are sorted in reverse-chronological order         4/18/2025   Exercise   Days per week of moderate/strenous exercise 6 days   Average minutes spent exercising at this level 90 min         4/18/2025   Social Factors   Frequency of gathering with friends or relatives Once a week   Worry food won't last until get money to buy more No   Food not last or not have enough money for food? No   Do you have housing? (Housing is defined as stable permanent housing and does not include staying ouside in a car, in a tent, in an abandoned building, in an overnight shelter, or couch-surfing.) Yes   Are you worried about losing your housing? No   Lack of transportation? No   Unable to get utilities (heat,electricity)? No         4/18/2025   Fall Risk   Fallen 2 or more times in the past year? No   Trouble with walking or balance? No          4/18/2025   Activities of Daily Living- Home Safety   Needs help with the following daily activites  None of the above   Safety concerns in the home None of the above         4/18/2025   Dental   Dentist two times every year? Yes         4/18/2025   Hearing Screening   Hearing concerns? (!) IT'S HARD TO FOLLOW A CONVERSATION IN A NOISY RESTAURANT OR CROWDED ROOM.    (!) TROUBLE UNDERSTANDING SOFT OR WHISPERED SPEECH.       Multiple values from one day are sorted in reverse-chronological order         4/18/2025   Driving Risk Screening   Patient/family members have concerns about driving No         4/18/2025   General Alertness/Fatigue Screening   Have you been more tired than usual lately? No         4/18/2025   Urinary Incontinence Screening   Bothered by leaking urine in past 6 months No         Today's PHQ-2 Score:       4/23/2025     7:41 AM   PHQ-2 ( 1999 Pfizer)   Q1: Little interest or pleasure in doing things 0   Q2: Feeling down, depressed or hopeless 0   PHQ-2 Score 0    Q1: Little interest or pleasure in doing things Not at all   Q2: Feeling down, depressed or hopeless Not at all   PHQ-2 Score 0       Patient-reported           4/18/2025   Substance Use   Alcohol more than 3/day or more than 7/wk No   Do you have a current opioid prescription? No   How severe/bad is pain from 1 to 10? 1/10   Do you use any other substances recreationally? No     Social History     Tobacco Use    Smoking status: Never    Smokeless tobacco: Never   Vaping Use    Vaping status: Never Used   Substance Use Topics    Drug use: Never           4/18/2025   AAA Screening   Family history of Abdominal Aortic Aneurysm (AAA)? No   ASCVD Risk   The ASCVD Risk score (Reshma DK, et al., 2019) failed to calculate for the following reasons:    The valid total cholesterol range is 130 to 320 mg/dL      Reviewed and updated as needed this visit by Provider   Tobacco  Allergies  Meds  Problems  Med Hx  Surg Hx  Fam Hx            Current providers sharing in care for this patient include:  Patient Care Team:  Renzo Zafar  "MD Sunny as PCP - General (Family Medicine)  Renzo Zafar MD as Assigned PCP    The following health maintenance items are reviewed in Epic and correct as of today:  Health Maintenance   Topic Date Due    ADVANCE CARE PLANNING  Never done    COVID-19 Vaccine (8 - 2024-25 season) 03/19/2025    MEDICARE ANNUAL WELLNESS VISIT  04/22/2025    FALL RISK ASSESSMENT  04/23/2026    COLORECTAL CANCER SCREENING  05/10/2026    RSV VACCINE (1 - 1-dose 75+ series) 06/05/2026    DTAP/TDAP/TD IMMUNIZATION (3 - Td or Tdap) 06/13/2026    DIABETES SCREENING  04/23/2028    LIPID  04/22/2029    HEPATITIS C SCREENING  Completed    PHQ-2 (once per calendar year)  Completed    INFLUENZA VACCINE  Completed    Pneumococcal Vaccine: 50+ Years  Completed    ZOSTER IMMUNIZATION  Completed    HPV IMMUNIZATION  Aged Out    MENINGITIS IMMUNIZATION  Aged Out        Objective    Exam  /75 (BP Location: Left arm, Patient Position: Sitting, Cuff Size: Adult Regular)   Pulse 68   Temp 97.4  F (36.3  C) (Temporal)   Resp 15   Ht 1.84 m (6' 0.44\")   Wt 78.2 kg (172 lb 8 oz)   SpO2 98%   BMI 23.11 kg/m     Estimated body mass index is 23.11 kg/m  as calculated from the following:    Height as of this encounter: 1.84 m (6' 0.44\").    Weight as of this encounter: 78.2 kg (172 lb 8 oz).    Physical Exam  GENERAL: Appears well.   EYES: Eyes grossly normal to inspection  HENT: ear canals and TM's normal, nose and mouth without ulcers or lesions  NECK: no adenopathy, no asymmetry, masses, or scars  RESP: lungs clear to auscultation - no rales, rhonchi or wheezes  CV: regular rate and rhythm, normal S1 S2, no S3 or S4, no murmur, click or rub, no peripheral edema  ABDOMEN: soft, nontender, no hepatosplenomegaly, no masses and bowel sounds normal  MS: no gross musculoskeletal defects noted, no edema  SKIN: no suspicious lesions or rashes  NEURO: Normal strength and tone, mentation intact and speech normal  PSYCH: mentation appears normal, " affect normal         4/23/2025   Mini Cog   Clock Draw Score 2 Normal   3 Item Recall 3 objects recalled   Mini Cog Total Score 5         Vision Screen         Signed Electronically by: Renzo Zafar MD

## 2025-07-03 ENCOUNTER — DOCUMENTATION ONLY (OUTPATIENT)
Dept: OTHER | Facility: CLINIC | Age: 75
End: 2025-07-03
Payer: COMMERCIAL

## 2025-07-04 ENCOUNTER — MYC REFILL (OUTPATIENT)
Dept: FAMILY MEDICINE | Facility: CLINIC | Age: 75
End: 2025-07-04

## 2025-07-04 DIAGNOSIS — N40.1 BENIGN PROSTATIC HYPERPLASIA WITH LOWER URINARY TRACT SYMPTOMS, SYMPTOM DETAILS UNSPECIFIED: ICD-10-CM

## 2025-07-07 RX ORDER — TAMSULOSIN HYDROCHLORIDE 0.4 MG/1
0.4 CAPSULE ORAL 2 TIMES DAILY
Qty: 180 CAPSULE | Refills: 0 | Status: SHIPPED | OUTPATIENT
Start: 2025-07-07

## 2025-07-07 NOTE — TELEPHONE ENCOUNTER
Medication requested: tamsulosin (FLOMAX) 0.4 MG capsule   Last office visit: 4/23/2025  [facility] appointments: none  Medication last refilled: 2/26/2025; #180 + 0 refills  Last qualifying labs:     BP Readings from Last 3 Encounters:   04/23/25 122/75   04/22/24 115/67   12/12/23 122/78     Routing refill request to provider for review/approval because:  Failed med refill protocol due to sildenafil prescription.    MAYRA Ferrer, RN  07/07/25, 3:50 PM

## 2025-07-08 RX ORDER — TAMSULOSIN HYDROCHLORIDE 0.4 MG/1
0.4 CAPSULE ORAL 2 TIMES DAILY
Qty: 180 CAPSULE | Refills: 3 | OUTPATIENT
Start: 2025-07-08

## 2025-08-17 ENCOUNTER — MYC REFILL (OUTPATIENT)
Dept: FAMILY MEDICINE | Facility: CLINIC | Age: 75
End: 2025-08-17

## 2025-08-17 DIAGNOSIS — N52.9 ERECTILE DYSFUNCTION, UNSPECIFIED ERECTILE DYSFUNCTION TYPE: ICD-10-CM

## 2025-08-18 RX ORDER — SILDENAFIL CITRATE 20 MG/1
TABLET ORAL
Qty: 30 TABLET | Refills: 3 | Status: SHIPPED | OUTPATIENT
Start: 2025-08-18